# Patient Record
Sex: FEMALE | Race: WHITE | NOT HISPANIC OR LATINO | Employment: FULL TIME | URBAN - METROPOLITAN AREA
[De-identification: names, ages, dates, MRNs, and addresses within clinical notes are randomized per-mention and may not be internally consistent; named-entity substitution may affect disease eponyms.]

---

## 2024-09-27 ENCOUNTER — TELEPHONE (OUTPATIENT)
Dept: OTHER | Facility: OTHER | Age: 64
End: 2024-09-27

## 2024-09-27 ENCOUNTER — OFFICE VISIT (OUTPATIENT)
Dept: URGENT CARE | Facility: MEDICAL CENTER | Age: 64
End: 2024-09-27
Payer: COMMERCIAL

## 2024-09-27 ENCOUNTER — APPOINTMENT (OUTPATIENT)
Dept: RADIOLOGY | Facility: MEDICAL CENTER | Age: 64
End: 2024-09-27
Payer: COMMERCIAL

## 2024-09-27 VITALS
HEIGHT: 64 IN | HEART RATE: 59 BPM | WEIGHT: 142 LBS | SYSTOLIC BLOOD PRESSURE: 110 MMHG | RESPIRATION RATE: 16 BRPM | OXYGEN SATURATION: 98 % | BODY MASS INDEX: 24.24 KG/M2 | DIASTOLIC BLOOD PRESSURE: 61 MMHG | TEMPERATURE: 97.5 F
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
## 2024-09-27 DIAGNOSIS — S69.92XA INJURY OF LEFT WRIST, INITIAL ENCOUNTER: ICD-10-CM

## 2024-09-27 DIAGNOSIS — S60.449A TIGHT RING ON FINGER: ICD-10-CM

## 2024-09-27 DIAGNOSIS — S62.102A CLOSED FRACTURE OF LEFT WRIST, INITIAL ENCOUNTER: Primary | ICD-10-CM

## 2024-09-27 PROCEDURE — S9083 URGENT CARE CENTER GLOBAL: HCPCS

## 2024-09-27 PROCEDURE — G0384 LEV 5 HOSP TYPE B ED VISIT: HCPCS

## 2024-09-27 PROCEDURE — 73110 X-RAY EXAM OF WRIST: CPT

## 2024-09-27 PROCEDURE — 29125 APPL SHORT ARM SPLINT STATIC: CPT

## 2024-09-27 NOTE — PATIENT INSTRUCTIONS
"Your finalized x-ray results will be available on [facility] when read by the radiologist.  Recommend rest, ice, elevation.   Over the counter pain medication as directed on packaging as needed for pain (ex: Tylenol, ibuprofen - may alternate these two medications every 3 hours).  Placed in splint, keep in place and do not get wet until orthopedics follow up.  Utilize sling provided.    Follow up with PCP in 3-5 days.  Proceed to  ER if symptoms worsen.    If tests are performed, our office will contact you with results only if changes need to made to the care plan discussed with you at the visit. You can review your full results on [facility].    Patient Education     Wrist fracture   The Basics   Written by the doctors and editors at [facility]   What is a wrist fracture? -- A \"fracture\" is another word for a broken bone. There are different types of fractures, depending on which bone breaks and how it breaks. When a bone breaks, it might crack, break all of the way through, or shatter.  There are different types of wrist fractures. Some involve a break in 1 of the small bones in the wrist. Others involve a break in 1 or both of the forearm bones near the wrist (figure 1). A common type of wrist fracture involves the end of 1 of the forearm bones. It can happen when a person falls onto their outstretched hand.  What are the symptoms of a wrist fracture? -- Symptoms depend on which bone breaks and the type of break it is. Common symptoms can include:   Pain, swelling, or bruising over the area   The area looking abnormal, bent, or not the usual shape   Not being able to move the wrist and hand, or stiffness   Numbness in the area of the broken bone or part of the hand  Is there a test for a wrist fracture? -- Yes. The doctor or nurse will ask about your symptoms, do an exam, and take an X-ray. They will also ask about how the injury happened.  They might also do other imaging tests, such as a CT or ultrasound. " "Imaging tests create pictures of the inside of the body.  How are wrist fractures treated? -- Treatment depends, in part, on the type of fracture and how serious it is. The goal is to have the ends of the broken bone line up with each other so the bone can heal.  If the ends of the broken bone are already in line with each other, the doctor will put a cast, splint, or brace on the wrist. This will keep the bone in the correct position so it can heal. Children with a very minor fracture called a \"buckle fracture\" might only need a gauze or elastic bandage.  If the ends of the broken bone are not in line with each other, the doctor will need to line them up:   Sometimes, they can move the bone to the correct position without doing surgery, and then put a cast, splint, or brace on. This is called \"closed fracture reduction.\" Most wrist fractures in children only require casting.   For more serious fractures and for some wrist fractures in adults, they might need to do surgery to put the bone back in the correct position. During surgery, they can use screws, pins, rods, or plates to fix the bone inside the body. This is called \"open fracture reduction.\" In some cases, the hardware is removed after the fracture is healed.  How long do wrist fractures take to heal? -- Most fractures take weeks to months to heal. The doctor or nurse will talk to you about when to return to things like work, sports, or other activities.  Healing time also depends on the person. Healthy children usually heal much more quickly than older adults or adults with other medical problems.  How do I care for myself at home? -- To care for yourself or your child at home:   Follow the doctor's instructions for wearing the splint, brace, or cast. This supports and protects the bone as it heals. Some fractures are placed in a cast right away. Other fractures are not put in a cast until after the swelling goes down. You might also get a sling to support " your arm and wrist.   Do not get the cast wet unless the doctor says that it is waterproof.   Follow instructions for limiting activity and movement until the bone is healed. The doctor or nurse will tell you what activities are safe to do.   Prop the injured arm on pillows, keeping it above the level of the heart. This might help lessen pain and swelling for the first several days after the injury.   The doctor might recommend an over-the-counter pain medicine. These include acetaminophen (sample brand name: Tylenol), ibuprofen (sample brand names: Advil, Motrin), and naproxen (sample brand name: Aleve).   Some people get a prescription for stronger pain medicines to take for a short time. Follow the instructions for taking these medicines.   Ice can help with pain and swelling:   Put a cold gel pack, bag of ice, or bag of frozen vegetables on the injured area every 1 to 2 hours, for 15 minutes each time. Put a thin towel between the ice (or other cold object) and the skin.   Use the ice (or other cold object) for at least 6 hours after the injury. Some people find it helpful to ice longer, even up to 2 days after their injury.   Eat a healthy diet that includes plenty of calcium, vitamin D, and protein (figure 2).   If you smoke, try to stop. Broken bones take longer to heal if you smoke.   Some people need to work with a physical therapist (exercise expert) after their fracture heals. The physical therapist will suggest exercises and stretches to strengthen the wrist and hand muscles and keep them from getting stiff.  When should I call the doctor? -- Call for advice if:   There is less feeling or movement in your fingers.   Your hand or wrist becomes swollen or starts to hurt more.   Your skin becomes red or irritated around the cast, or the redness starts to spread up your arm.   The splint or cast feels too tight and uncomfortable.   There is a bad smell or drainage coming from the splint or cast.   The cast  "feels too loose, you notice a crack in the cast, or the cast becomes soft.   The cast gets wet, and it is not supposed to get wet.  All topics are updated as new evidence becomes available and our peer review process is complete.  This topic retrieved from [employer] on: Mar 21, 2024.  Topic 371044 Version 1.0  Release: 32.2.4 - C32.79  © 2024 UpToDate, Inc. and/or its affiliates. All rights reserved.  figure 1: Hand and wrist bones     This drawing shows the bones of the hand and wrist.  Graphic 81468 Version 2.0  figure 2: Foods and drinks with calcium and vitamin D     Foods rich in calcium include ice cream, soy milk, breads, kale, broccoli, milk, cheese, cottage cheese, almonds, yogurt, ready-to-eat cereals, beans, and tofu. Foods rich in vitamin D include milk, fortified plant-based \"milks\" (soy, almond), canned tuna fish, cod liver oil, yogurt, ready-to-eat-cereals, cooked salmon, canned sardines, mackerel, and eggs. Some of these foods are rich in both.  Graphic 98318 Version 4.0  Consumer Information Use and Disclaimer   Disclaimer: This generalized information is a limited summary of diagnosis, treatment, and/or medication information. It is not meant to be comprehensive and should be used as a tool to help the user understand and/or assess potential diagnostic and treatment options. It does NOT include all information about conditions, treatments, medications, side effects, or risks that may apply to a specific patient. It is not intended to be medical advice or a substitute for the medical advice, diagnosis, or treatment of a health care provider based on the health care provider's examination and assessment of a patient's specific and unique circumstances. Patients must speak with a health care provider for complete information about their health, medical questions, and treatment options, including any risks or benefits regarding use of medications. This information does not endorse any treatments or " medications as safe, effective, or approved for treating a specific patient. UpToDate, Inc. and its affiliates disclaim any warranty or liability relating to this information or the use thereof.The use of this information is governed by the Terms of Use, available at https://www.[employer].com/en/know/clinical-effectiveness-terms. 2024© UpToDate, Inc. and its affiliates and/or licensors. All rights reserved.  Copyright   © 2024 UpToDate, Inc. and/or its affiliates. All rights reserved.

## 2024-09-27 NOTE — PROGRESS NOTES
[facility]        NAME: Nivia Del Valle is a 63 y.o. female  : 1960    MRN: [id number]  DATE: 2024  TIME: 8:28 PM    Assessment and Plan   Closed fracture of left wrist, initial encounter [S62.102A]  1. Closed fracture of left wrist, initial encounter  Ambulatory Referral to Orthopedic Surgery    CANCELED: Splint application      2. Tight ring on finger  Foreign body removal      3. Injury of left wrist, initial encounter  XR wrist 3+ vw left        Left wrist x-ray: Fracture to radius and ulna per preliminary read. Radiology to determine final read.    Ring removed from injured extremity by assisting provider Michael Acosta with ring cutter. Splint placed, sling provided, advised care. Offered prescription pain medication, patient declined. Advised symptomatic treatment. Referral to orthopedics placed for follow up.    Patient Instructions     Your finalized x-ray results will be available on [facility] when read by the radiologist.  Recommend rest, ice, elevation.   Over the counter pain medication as directed on packaging as needed for pain (ex: Tylenol, ibuprofen - may alternate these two medications every 3 hours).  Placed in splint, keep in place and do not get wet until orthopedics follow up.  Utilize sling provided.    Follow up with PCP in 3-5 days.  Proceed to  ER if symptoms worsen.    If tests are performed, our office will contact you with results only if changes need to made to the care plan discussed with you at the visit. You can [facility] full [facility].    Chief Complaint     Chief Complaint   Patient presents with    Wrist Injury     Pt slipped on water and fell injuring left wrist         History of Present Illness       Patient slipped on water on her kitchen floor and fell backwards. She put her left wrist out to support her when falling, leading to pain, swelling, bruising, and decreased range of motion of her left wrist. This occurred around 1-2  "hours prior to visit. She has been icing the injury since it happened, notes that has helped significantly with the pain. She has not taken any pain medication.    Wrist Pain   The pain is present in the left wrist. This is a new problem. The current episode started today. There has been a history of trauma (FOOSH). The problem occurs constantly. The problem has been unchanged. The pain is at a severity of 10/10. Associated symptoms include joint swelling, a limited range of motion and stiffness. Pertinent negatives include no numbness or tingling. She has tried cold for the symptoms. The treatment provided mild relief.       Review of Systems   Review of Systems   Musculoskeletal:  Positive for arthralgias, joint swelling and stiffness.   Skin:  Positive for color change (bruising).   Neurological:  Negative for tingling and numbness.         Current Medications     No current outpatient medications on file.    Current Allergies     Allergies as of 09/27/2024    (No Known Allergies)            The following portions of the patient's history were reviewed and updated as appropriate: allergies, current medications, past family history, past medical history, past social history, past surgical history and problem list.     History reviewed. No pertinent past medical history.    History reviewed. No pertinent surgical history.    History reviewed. No pertinent family history.      Medications have been verified.        Objective   /61   Pulse 59   Temp 97.5 °F (36.4 °C) (Tympanic)   Resp 16   Ht 5' 4\" (1.626 m)   Wt 64.4 kg (142 lb)   SpO2 98%   BMI 24.37 kg/m²        Physical Exam     Physical Exam  Vitals and nursing note reviewed.   Constitutional:       General: She is not in acute distress.     Appearance: Normal appearance. She is not ill-appearing.   Cardiovascular:      Rate and Rhythm: Normal rate and regular rhythm.      Pulses: Normal pulses.      Heart sounds: Normal heart sounds.   Pulmonary: " "     Effort: Pulmonary effort is normal.      Breath sounds: Normal breath sounds.   Musculoskeletal:      Left wrist: Swelling, deformity, tenderness and bony tenderness (radius and ulna) present. Decreased range of motion. Normal pulse.   Neurological:      Mental Status: She is alert.           Universal Protocol:  Procedure performed by: (Michael Acosta)  Consent: Verbal consent obtained.  Risks and benefits: risks, benefits and alternatives were discussed  Consent given by: patient  Time out: Immediately prior to procedure a \"time out\" was called to verify the correct patient, procedure, equipment, support staff and site/side marked as required.  Patient understanding: patient states understanding of the procedure being performed  Patient identity confirmed: verbally with patient  Foreign body removal    Date/Time: 9/27/2024 6:30 PM    Performed by: Leeanna Ryder PA-C  Authorized by: Leeanna Ryder PA-C  Intake: finger.    Sedation:  Patient sedated: no  Patient restrained: no  Patient cooperative: yes  Complexity: simple  1 objects recovered.  Objects recovered: ring  Post-procedure assessment: foreign body removed  Patient tolerance: patient tolerated the procedure well with no immediate complications  Comments: Ring stuck on left ring finger. Attempted to remove with wound packing strip wrapped around finger, unsuccessful. Utilized ring cutters to remove.    Splint application    Date/Time: 9/27/2024 6:30 PM    Performed by: Leeanna Ryder PA-C  Authorized by: Leeanna Ryder PA-C  Universal Protocol:  Procedure performed by: (Chantale Terrazas)  Consent: Verbal consent obtained.  Risks and benefits: risks, benefits and alternatives were discussed  Consent given by: patient  Time out: Immediately prior to procedure a \"time out\" was called to verify the correct patient, procedure, equipment, support staff and site/side marked as required.  Patient understanding: patient states " understanding of the procedure being performed  Patient identity confirmed: verbally with patient    Pre-procedure details:     Sensation:  Normal  Procedure details:     Laterality:  Left    Location:  Wrist    Wrist:  L wrist    Strapping: no  Cast type:  Short arm        Splint type:  Sugar tong    Supplies:  Cotton padding, elastic bandage, Ortho-Glass, 2 layer wrap and sling  Post-procedure details:     Pain:  Unchanged    Sensation:  Normal    Patient tolerance of procedure:  Tolerated well, no immediate complications

## 2024-09-28 NOTE — TELEPHONE ENCOUNTER
Pt needing to schedule f/u with ortho- new left wrist fx. Would like to schedule at [facility].

## 2024-10-01 ENCOUNTER — OFFICE VISIT (OUTPATIENT)
Dept: OBGYN CLINIC | Facility: MEDICAL CENTER | Age: 64
End: 2024-10-01
Payer: COMMERCIAL

## 2024-10-01 VITALS
DIASTOLIC BLOOD PRESSURE: 78 MMHG | WEIGHT: 142 LBS | HEIGHT: 64 IN | BODY MASS INDEX: 24.24 KG/M2 | HEART RATE: 71 BPM | SYSTOLIC BLOOD PRESSURE: 161 MMHG

## 2024-10-01 DIAGNOSIS — S62.102A CLOSED FRACTURE OF LEFT WRIST, INITIAL ENCOUNTER: Primary | ICD-10-CM

## 2024-10-01 PROCEDURE — 99204 OFFICE O/P NEW MOD 45 MIN: CPT | Performed by: ORTHOPAEDIC SURGERY

## 2024-10-01 RX ORDER — ACETAMINOPHEN 325 MG/1
325 TABLET ORAL EVERY 6 HOURS PRN
COMMUNITY

## 2024-10-01 RX ORDER — IBUPROFEN 200 MG
200 TABLET ORAL EVERY 6 HOURS PRN
COMMUNITY

## 2024-10-01 NOTE — PROGRESS NOTES
The HAND & UPPER EXTREMITY OFFICE VISIT   Referred By:  [place] [address] [address] [address]      Chief Complaint:     Left wrist pain  DOI: 9/27/24    History of Present Illness:   63 y.o., Right hand dominant female presents with left wrist pain following a slip and fall on 9/27. She reports walking into her dog's water bowl which splashed water onto the floor, and then caused her to slip and fall, landing onto the left wrist. Previously seen and evaluated at [facility] following the injury. Note and imaging reviewed in detail today. She was placed in a sugar tong splint during that visit.    Today she denies any numbness, tingling, or significant pain in the splint. She has been taking tylenol and motrin regularly for pain control.       ADLs: Community ambulator  Smoke: denies ETOH: denies   Drugs:  denies Job: employed, stock room       Past Medical History:  History reviewed. No pertinent past medical history.  History reviewed. No pertinent surgical history.  History reviewed. No pertinent family history.  Social History     Socioeconomic History    Marital status: /Civil Union     Spouse name: Not on file    Number of children: Not on file    Years of education: Not on file    Highest education level: Not on file   Occupational History    Not on file   Tobacco Use    Smoking status: Not on file    Smokeless tobacco: Not on file   Substance and Sexual Activity    Alcohol use: Not on file    Drug use: Not on file    Sexual activity: Not on file   Other Topics Concern    Not on file   Social History Narrative    Not on file     Social Determinants of Health     Financial Resource Strain: Not on file   Food Insecurity: Not on file   Transportation Needs: Not on file   Physical Activity: Not on file   Stress: Not on file   Social Connections: Not on file   Intimate Partner Violence: Not on file   Housing Stability: Not on file     Scheduled Meds:  Continuous Infusions:No  "current facility-administered medications for this visit.    PRN Meds:.  No Known Allergies        Physical Examination:    /78   Pulse 71   Ht 5' 4\" (1.626 m)   Wt 64.4 kg (142 lb)   BMI 24.37 kg/m²     Gen: A&Ox3, NAD  Cardiac: regular rate  Chest: non labored breathing  Abdomen: Non-distended      Right Upper Extremity:  Skin CDI  No obvious deformity of the shoulder, arm, elbow, forearm, wrist, hand  Sensation intact to light touch in the axillary median, ulnar, and radial nerve distributions  Motor function grossly intact  2+RP      Left Upper Extremity:  Sugar tong splint clean and intact  Visible skin CDI  Mild swelling of the fingers  Sensation intact to light touch in the axillary median, ulnar, and radial nerve distributions  Finger ROM intact within confines of the splint  Warm, well-perfused digits      Studies:  Radiographs: I personally reviewed and independently interpreted the available radiographs.  9/27/24: Radiographs of the left wrist, multiple views, demonstrate acute fracture of the distal radius with intra-articular extension. Approximately 47 degrees dorsal tilt and 7 mm ulnar positive variance. There is also a displaced fracture of the ulnar styloid.       Assessment and Plan:  1. Closed fracture of left wrist, initial encounter  Ambulatory Referral to Orthopedic Surgery          63 y.o. female presents with signs and symptoms consistent with the above diagnosis.  We discussed the natural history of this condition and its pathogenesis.  We discussed operative and nonoperative treatment options. XR were reviewed and were discussed with the patient. We discussed that due to the dorsal tilt and shortening of her distal radius fracture, surgical intervention is indicated. We reviewed the risks and benefits of both operative and non-operative management. We discussed that without surgery, risks include chronic pain and loss of wrist ROM due to malunion and ulnar prominence. We reviewed " the risks of surgery which include, but are not limited to, infection, bleeding, damage to nearby structures including nerves/arteries, stiffness, hardware failure, malunion, nonunion, need for further surgery, anesthesia risks, CRPS and continued pain.    After a long discussion and shared decision making she made the informed decision to proceed with non-operative intervention. We will plan to keep her splint in place for 1 more week then transition to a short arm cast once swelling has decreased. It is recommended she return to the office in 1 week, or sooner should symptoms worsen.    she expressed understanding of the plan and agreed. We encouraged them to contact our office with any questions or concerns.         Armando Riley MD  Hand and Upper Extremity Surgery        *This note was dictated using Dragon voice recognition software. Please excuse any word substitutions or errors.*      Scribe Attestation      I,:  Jessica Shi PA-C am acting as a scribe while in the presence of the attending physician.:       I,:  Armando Riley MD personally performed the services described in this documentation    as scribed in my presence.:

## 2024-10-09 ENCOUNTER — OFFICE VISIT (OUTPATIENT)
Dept: OBGYN CLINIC | Facility: MEDICAL CENTER | Age: 64
End: 2024-10-09
Payer: COMMERCIAL

## 2024-10-09 ENCOUNTER — APPOINTMENT (OUTPATIENT)
Dept: RADIOLOGY | Facility: MEDICAL CENTER | Age: 64
End: 2024-10-09
Payer: COMMERCIAL

## 2024-10-09 VITALS
HEART RATE: 73 BPM | SYSTOLIC BLOOD PRESSURE: 149 MMHG | BODY MASS INDEX: 24.92 KG/M2 | WEIGHT: 146 LBS | HEIGHT: 64 IN | DIASTOLIC BLOOD PRESSURE: 80 MMHG

## 2024-10-09 DIAGNOSIS — S62.102A CLOSED FRACTURE OF LEFT WRIST, INITIAL ENCOUNTER: Primary | ICD-10-CM

## 2024-10-09 DIAGNOSIS — S62.102A CLOSED FRACTURE OF LEFT WRIST, INITIAL ENCOUNTER: ICD-10-CM

## 2024-10-09 PROCEDURE — 29075 APPL CST ELBW FNGR SHORT ARM: CPT | Performed by: ORTHOPAEDIC SURGERY

## 2024-10-09 PROCEDURE — 73110 X-RAY EXAM OF WRIST: CPT

## 2024-10-09 PROCEDURE — 99213 OFFICE O/P EST LOW 20 MIN: CPT | Performed by: ORTHOPAEDIC SURGERY

## 2024-10-10 NOTE — PROGRESS NOTES
The HAND & UPPER EXTREMITY OFFICE VISIT   Referred By:  No referring provider defined for this encounter.      Chief Complaint:     Left wrist pain  DOI: 9/27/24    History of Present Illness:   63 y.o., Right hand dominant female presents with left wrist pain following a slip and fall on 9/27. She reports walking into her dog's water bowl which splashed water onto the floor, and then caused her to slip and fall, landing onto the left wrist. Previously seen and evaluated at [facility] following the injury. Note and imaging reviewed in detail today. She was placed in a sugar tong splint during that visit.    Today she denies any numbness, tingling, or significant pain in the splint. She has been taking tylenol and motrin regularly for pain control.       ADLs: Community ambulator  Smoke: denies ETOH: denies   Drugs:  denies Job: employed, stock room       Interval history:  States that her pain remains well-controlled.  Takes an occasional Tylenol but otherwise no discomfort.  Splint comfortable for the most part but at the top near her elbow she feels is little irritated and itching.  Denies numbness or tingling.  States that she continues to desire nonoperative management.      Past Medical History:  No past medical history on file.  No past surgical history on file.  No family history on file.  Social History     Socioeconomic History    Marital status: /Civil Union     Spouse name: Not on file    Number of children: Not on file    Years of education: Not on file    Highest education level: Not on file   Occupational History    Not on file   Tobacco Use    Smoking status: Not on file    Smokeless tobacco: Not on file   Substance and Sexual Activity    Alcohol use: Not on file    Drug use: Not on file    Sexual activity: Not on file   Other Topics Concern    Not on file   Social History Narrative    Not on file     Social Determinants of Health     Financial Resource Strain: Not on file   Food Insecurity: Not  "on file   Transportation Needs: Not on file   Physical Activity: Not on file   Stress: Not on file   Social Connections: Not on file   Intimate Partner Violence: Not on file   Housing Stability: Not on file     Scheduled Meds:  Continuous Infusions:No current facility-administered medications for this visit.    PRN Meds:.  No Known Allergies        Physical Examination:    /80   Pulse 73   Ht 5' 4\" (1.626 m)   Wt 66.2 kg (146 lb)   BMI 25.06 kg/m²     Gen: A&Ox3, NAD        Left Upper Extremity:  Sugar tong splint clean and intact (remove)  Underlying skin CDI  Slight radial deviation extension deformity of the wrist with swelling around the distal radius  Mild swelling of the fingers  Able to make a composite fist  Sensation intact to light touch in the axillary median, ulnar, and radial nerve distributions  Warm, well-perfused digits      Studies:  Radiographs: I personally reviewed and independently interpreted the available radiographs.  10/9/24: Radiographs of the left wrist, multiple views, demonstrate stable alignment of the distal radius with intra-articular extension. Approximately 47 degrees dorsal tilt and 7 mm ulnar positive variance. There is also a displaced fracture of the ulnar styloid.       Assessment and Plan:  1. Closed fracture of left wrist, initial encounter  XR wrist 3+ vw left          63 y.o. female is now approximately 2 weeks out from injury.  We reviewed her radiographs and continued displacement.  We discussed again the potential risks of nonoperative management and malunion.  She understands these risks and wished to proceed with continued nonoperative management.  Her swelling is improved and we transitioned her to a cast today.  This is well-tolerated.  Please see procedure note for details.      I will see her back in 2 weeks for repeat radiographs in the cast.  Anticipate an additional 4 to 8 weeks of casting depending on radiographic healing.    she expressed " understanding of the plan and agreed. We encouraged them to contact our office with any questions or concerns.         Armando Riley MD  Hand and Upper Extremity Surgery        *This note was dictated using Dragon voice recognition software. Please excuse any word substitutions or errors.*      Scribe Attestation      I,:   am acting as a scribe while in the presence of the attending physician.:       I,:   personally performed the services described in this documentation    as scribed in my presence.:

## 2024-10-23 ENCOUNTER — OFFICE VISIT (OUTPATIENT)
Dept: OBGYN CLINIC | Facility: MEDICAL CENTER | Age: 64
End: 2024-10-23
Payer: COMMERCIAL

## 2024-10-23 ENCOUNTER — APPOINTMENT (OUTPATIENT)
Dept: RADIOLOGY | Facility: MEDICAL CENTER | Age: 64
End: 2024-10-23
Payer: COMMERCIAL

## 2024-10-23 VITALS
WEIGHT: 146 LBS | HEART RATE: 69 BPM | BODY MASS INDEX: 24.92 KG/M2 | SYSTOLIC BLOOD PRESSURE: 131 MMHG | DIASTOLIC BLOOD PRESSURE: 75 MMHG | HEIGHT: 64 IN

## 2024-10-23 DIAGNOSIS — S62.102A CLOSED FRACTURE OF LEFT WRIST, INITIAL ENCOUNTER: ICD-10-CM

## 2024-10-23 DIAGNOSIS — S62.102D CLOSED FRACTURE OF LEFT WRIST WITH ROUTINE HEALING, SUBSEQUENT ENCOUNTER: Primary | ICD-10-CM

## 2024-10-23 PROCEDURE — 73110 X-RAY EXAM OF WRIST: CPT

## 2024-10-23 PROCEDURE — 99213 OFFICE O/P EST LOW 20 MIN: CPT | Performed by: ORTHOPAEDIC SURGERY

## 2024-10-23 NOTE — PROGRESS NOTES
"The HAND & UPPER EXTREMITY OFFICE VISIT   Referred By:  No referring provider defined for this encounter.      Chief Complaint:     Left wrist pain  DOI: 9/27/24      History of Present Illness:   Left distal radius fracture sustained 9/27/2024, treated non-operatively per patient's wishes with closed immobilization in a short arm cast that was placed at her last visit.       Interval History 10/23/2024:   Today she denies any numbness, tingling, or significant pain in the cast. She has not needed pain medication. She has been elevating the extremity. She has kept the cast clean and dry.       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  No past surgical history on file.  No family history on file.  Social History     Socioeconomic History    Marital status: /Civil Union     Spouse name: Not on file    Number of children: Not on file    Years of education: Not on file    Highest education level: Not on file   Occupational History    Not on file   Tobacco Use    Smoking status: Not on file    Smokeless tobacco: Not on file   Substance and Sexual Activity    Alcohol use: Not on file    Drug use: Not on file    Sexual activity: Not on file   Other Topics Concern    Not on file   Social History Narrative    Not on file     Social Determinants of Health     Financial Resource Strain: Not on file   Food Insecurity: Not on file   Transportation Needs: Not on file   Physical Activity: Not on file   Stress: Not on file   Social Connections: Not on file   Intimate Partner Violence: Not on file   Housing Stability: Not on file     Scheduled Meds:  Continuous Infusions:No current facility-administered medications for this visit.    PRN Meds:.  No Known Allergies        Physical Examination:    /75   Pulse 69   Ht 5' 4\" (1.626 m)   Wt 66.2 kg (146 lb)   BMI 25.06 kg/m²     Gen: A&Ox3, " NAD        Left Upper Extremity:  Short arm cast intact, clean, dry  Mild swelling of the fingers  Unable to make a composite fist, limited by cast  Unable to make claw fist due to some PIP stiffness  Sensation intact to light touch in the axillary median, ulnar, and radial nerve distributions  Warm, well-perfused digits      Studies:  Radiographs: I personally reviewed and independently interpreted the available radiographs.  10/23/24: Radiographs of the left wrist, multiple views, demonstrate stable alignment of the distal radius with intra-articular extension.     10/9/24: Radiographs of the left wrist, multiple views, demonstrate stable alignment of the distal radius with intra-articular extension. Approximately 47 degrees dorsal tilt and 7 mm ulnar positive variance. There is also a displaced fracture of the ulnar styloid.       Assessment and Plan:  1. Closed fracture of left wrist with routine healing, subsequent encounter  XR wrist 3+ vw left          63 y.o. female is now approximately 3.5 weeks out from injury.  We reviewed her radiographs and continued displacement.  We discussed again the potential risks of nonoperative management and malunion.  Discussed elevating the extremity and emphasized the importance of performing range of motion exercises of the fingers and elbow to prevent stiffness.     I will see her back in 3 weeks for repeat radiographs in the cast.  Anticipate an additional 3 to 6 weeks of casting depending on radiographic healing.    she expressed understanding of the plan and agreed. We encouraged them to contact our office with any questions or concerns.         Armando Riley MD  Hand and Upper Extremity Surgery        *This note was dictated using Dragon voice recognition software. Please excuse any word substitutions or errors.*      Scribe Attestation      I,:  Brenda Mixon am acting as a scribe while in the presence of the attending physician.:       I,:  Armando Grier  MD Rosemary personally performed the services described in this documentation    as scribed in my presence.:

## 2024-11-20 ENCOUNTER — APPOINTMENT (OUTPATIENT)
Dept: RADIOLOGY | Facility: MEDICAL CENTER | Age: 64
End: 2024-11-20
Payer: COMMERCIAL

## 2024-11-20 ENCOUNTER — OFFICE VISIT (OUTPATIENT)
Dept: OBGYN CLINIC | Facility: MEDICAL CENTER | Age: 64
End: 2024-11-20
Payer: COMMERCIAL

## 2024-11-20 VITALS
HEIGHT: 64 IN | DIASTOLIC BLOOD PRESSURE: 80 MMHG | BODY MASS INDEX: 25.95 KG/M2 | HEART RATE: 74 BPM | SYSTOLIC BLOOD PRESSURE: 147 MMHG | WEIGHT: 152 LBS

## 2024-11-20 DIAGNOSIS — S62.102D CLOSED FRACTURE OF LEFT WRIST WITH ROUTINE HEALING, SUBSEQUENT ENCOUNTER: ICD-10-CM

## 2024-11-20 DIAGNOSIS — S62.102D CLOSED FRACTURE OF LEFT WRIST WITH ROUTINE HEALING, SUBSEQUENT ENCOUNTER: Primary | ICD-10-CM

## 2024-11-20 PROCEDURE — 99213 OFFICE O/P EST LOW 20 MIN: CPT | Performed by: ORTHOPAEDIC SURGERY

## 2024-11-20 PROCEDURE — 73110 X-RAY EXAM OF WRIST: CPT

## 2024-11-20 NOTE — LETTER
November 20, 2024     Patient: Nivia Del Valle  YOB: 1960  Date of Visit: 11/20/2024      To Whom it May Concern:    Nivia Del Valle is under my professional care. Nivia was seen in my office on 11/20/2024. Nivia may return to work with limitations on 12/1/24. No lifting/pushing/pulling >2 lbs with the left wrist, and must be allowed to wear brace .    If you have any questions or concerns, please don't hesitate to call.         Sincerely,          Armando Riley MD        CC: No Recipients

## 2024-11-20 NOTE — PROGRESS NOTES
"The HAND & UPPER EXTREMITY OFFICE VISIT   Referred By:  No referring provider defined for this encounter.      Chief Complaint:     Left wrist pain  DOI: 9/27/24      History of Present Illness:   Left distal radius fracture sustained 9/27/2024, treated non-operatively per patient's wishes with closed immobilization in a short arm cast that was placed at her last visit.       Interval History 10/23/2024:   Today she denies any numbness, tingling, or significant pain in the cast. She has not needed pain medication. She has been elevating the extremity. She has kept the cast clean and dry.       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 No pertinent past medical history.  History reviewed. No pertinent surgical history.  History reviewed. No pertinent family history.  Social History     Socioeconomic History    Marital status: /Civil Union     Spouse name: Not on file    Number of children: Not on file    Years of education: Not on file    Highest education level: Not on file   Occupational History    Not on file   Tobacco Use    Smoking status: Not on file    Smokeless tobacco: Not on file   Substance and Sexual Activity    Alcohol use: Never    Drug use: Never    Sexual activity: Not on file   Other Topics Concern    Not on file   Social History Narrative    Not on file     Social Drivers of Health     Financial Resource Strain: Not on file   Food Insecurity: Not on file   Transportation Needs: Not on file   Physical Activity: Not on file   Stress: Not on file   Social Connections: Not on file   Intimate Partner Violence: Not on file   Housing Stability: Not on file     Scheduled Meds:  Continuous Infusions:No current facility-administered medications for this visit.    PRN Meds:.  No Known Allergies        Physical Examination:    /80   Pulse 74   Ht 5' 4\" (1.626 m)   Wt 68.9 kg (152 lb)   BMI " 26.09 kg/m²     Gen: A&Ox3, NAD        Left Upper Extremity:  Short arm cast intact, clean, dry  Mild swelling of the fingers  Unable to make a composite fist, limited by cast  Unable to make claw fist due to some PIP stiffness  Sensation intact to light touch in the axillary median, ulnar, and radial nerve distributions  Warm, well-perfused digits      Studies:  Radiographs: I personally reviewed and independently interpreted the available radiographs.  11/20/24: Radiographs of the left wrist, multiple views, demonstrate stable alignment of the distal radius with intra-articular extension.  There is interval callus formation, particular dorsally which is bridging.  There is significant shortening and ulnar positive variance.    10/9/24: Radiographs of the left wrist, multiple views, demonstrate stable alignment of the distal radius with intra-articular extension. Approximately 47 degrees dorsal tilt and 7 mm ulnar positive variance. There is also a displaced fracture of the ulnar styloid.       Assessment and Plan:  1. Closed fracture of left wrist with routine healing, subsequent encounter  XR wrist 3+ vw left    Cock Up Wrist Splint          64 y.o. female is now approximately 7 weeks out from injury.  We reviewed her radiographs and continued malunion.  She does seem to have interval callus formation particular bridging callus dorsally.  I think it is stable for her to transition removable brace at this time.  She can start to work on active wrist range of motion but avoid heavy lifting.  Wear the brace continuously except for range of motion hygiene for at least 2 more weeks.  She can then return light duty to work with less than 2 pounds lifting while wearing the brace.      I will see her back in 4 weeks for repeat radiographs and a range of motion check    she expressed understanding of the plan and agreed. We encouraged them to contact our office with any questions or concerns.         Armando Riley,  MD  Hand and Upper Extremity Surgery        *This note was dictated using Dragon voice recognition software. Please excuse any word substitutions or errors.*

## 2024-11-21 ENCOUNTER — TELEPHONE (OUTPATIENT)
Dept: OBGYN CLINIC | Facility: HOSPITAL | Age: 64
End: 2024-11-21

## 2024-11-21 NOTE — TELEPHONE ENCOUNTER
Caller: Pt    Doctor: Rosemary     Reason for call: requesting a new note stating to stay out of work. She doesn't feel comfortable returning on Dec 1st. States that she is retiring end of December. She would like to extend short term disability to 12/11.      Call back#: [phone]

## 2024-11-25 ENCOUNTER — TELEPHONE (OUTPATIENT)
Age: 64
End: 2024-11-25

## 2024-12-09 ENCOUNTER — TELEPHONE (OUTPATIENT)
Age: 64
End: 2024-12-09

## 2024-12-09 NOTE — TELEPHONE ENCOUNTER
Confirmed no current STD/FMLA paperwork in media section of chart.  Patient going to bring paperwork to office as it needs to be filled out by 12/10/24.

## 2024-12-11 ENCOUNTER — TELEPHONE (OUTPATIENT)
Dept: OBGYN CLINIC | Facility: HOSPITAL | Age: 64
End: 2024-12-11

## 2024-12-11 NOTE — TELEPHONE ENCOUNTER
Caller: Patient    Doctor: Rosemary    Reason for call: Patient is calling about her disability paperwork. The paperwork that was faxed on 11/15/24 was faxed to an incorrect # It should go to [phone]. Can you please refax to that #. Also she wondered if the new paperwork she dropped off could be completed as well since they are going to stop disability benefits if not received. Thank you.    Call back#: [phone]

## 2024-12-11 NOTE — TELEPHONE ENCOUNTER
Patient is calling back asking if the documents were re faxed to the  faxes number: [phone]. Informed patient forms were faxed. No further questions.

## 2024-12-11 NOTE — TELEPHONE ENCOUNTER
Caller: Patient    Doctor: Rosemary    Reason for call: The patient called to advise the fax# in the previous message is incorrect. Please fax the forms to [phone]. Please contact the patient to confirm the forms were faxed    Call back#: [phone]

## 2024-12-12 NOTE — TELEPHONE ENCOUNTER
Caller: Cristhian from [facility]    Doctor/Office: Rosemary/Katie    CB#:  ext [id number]      What needs to be faxed: ROSEY from 11/20/24.    ATTN to: Cristhian    Fax#: [phone]      Documents were successfully e-faxed

## 2024-12-19 ENCOUNTER — APPOINTMENT (OUTPATIENT)
Dept: RADIOLOGY | Facility: MEDICAL CENTER | Age: 64
End: 2024-12-19
Payer: COMMERCIAL

## 2024-12-19 ENCOUNTER — OFFICE VISIT (OUTPATIENT)
Dept: OBGYN CLINIC | Facility: MEDICAL CENTER | Age: 64
End: 2024-12-19
Payer: COMMERCIAL

## 2024-12-19 VITALS — BODY MASS INDEX: 26.29 KG/M2 | HEIGHT: 64 IN | WEIGHT: 154 LBS

## 2024-12-19 DIAGNOSIS — S52.572P OTHER CLOSED INTRA-ARTICULAR FRACTURE OF DISTAL END OF LEFT RADIUS WITH MALUNION, SUBSEQUENT ENCOUNTER: Primary | ICD-10-CM

## 2024-12-19 DIAGNOSIS — M25.532 PAIN IN LEFT WRIST: ICD-10-CM

## 2024-12-19 PROCEDURE — 99213 OFFICE O/P EST LOW 20 MIN: CPT | Performed by: ORTHOPAEDIC SURGERY

## 2024-12-19 PROCEDURE — 73110 X-RAY EXAM OF WRIST: CPT

## 2024-12-19 NOTE — PROGRESS NOTES
The HAND & UPPER EXTREMITY OFFICE VISIT   Referred By:  Referral Self  No address on file      Chief Complaint:     Left wrist pain  DOI: 9/27/24      History of Present Illness:   Left distal radius fracture sustained 9/27/2024, treated non-operatively per patient's wishes with closed immobilization in a short arm cast that was placed at her last visit.       Interval History 12/19/2024:   She is now approximately 3 months status post injury.  Today she denies any numbness, tingling, or significant pain.  She is advancing her activities and she feels that she is getting better day by day.  She still wearing the brace to sleep and and for activities during the day.       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 No pertinent past medical history.  History reviewed. No pertinent surgical history.  History reviewed. No pertinent family history.  Social History     Socioeconomic History    Marital status: /Civil Union     Spouse name: Not on file    Number of children: Not on file    Years of education: Not on file    Highest education level: Not on file   Occupational History    Not on file   Tobacco Use    Smoking status: Never    Smokeless tobacco: Never   Vaping Use    Vaping status: Never Used   Substance and Sexual Activity    Alcohol use: Never    Drug use: Never    Sexual activity: Not on file   Other Topics Concern    Not on file   Social History Narrative    Not on file     Social Drivers of Health     Financial Resource Strain: Not on file   Food Insecurity: Not on file   Transportation Needs: Not on file   Physical Activity: Not on file   Stress: Not on file   Social Connections: Not on file   Intimate Partner Violence: Not on file   Housing Stability: Not on file     Scheduled Meds:  Continuous Infusions:No current facility-administered medications for this visit.    PRN Meds:.  No Known  "Allergies        Physical Examination:    Ht 5' 4\" (1.626 m)   Wt 69.9 kg (154 lb)   BMI 26.43 kg/m²     Gen: A&Ox3, NAD    Left Upper Extremity:  Skin intact  Prominence of the ulnar head noted with radial deviation of the wrist  Mild swelling of the fingers and wrist  Able to make 90% composite fist  Unable to make claw fist due to some PIP stiffness  Minimal wrist range of motion with approximately 10 degrees flexion extension  60 degrees pronation, 45 degrees supination  Nontender  Sensation intact to light touch in the axillary median, ulnar, and radial nerve distributions  Warm, well-perfused digits      Studies:  Radiographs: I personally reviewed and independently interpreted the available radiographs.  12/19/2024: Radiographs of the left wrist, multiple views demonstrate stable alignment with interval bridging callus formation.  Continued shortening and ulnar positive variance.    11/20/24: Radiographs of the left wrist, multiple views, demonstrate stable alignment of the distal radius with intra-articular extension.  There is interval callus formation, particular dorsally which is bridging.  There is significant shortening and ulnar positive variance.    10/9/24: Radiographs of the left wrist, multiple views, demonstrate stable alignment of the distal radius with intra-articular extension. Approximately 47 degrees dorsal tilt and 7 mm ulnar positive variance. There is also a displaced fracture of the ulnar styloid.       Assessment and Plan:  1. Other closed intra-articular fracture of distal end of left radius with malunion, subsequent encounter        2. Pain in left wrist  XR wrist 3+ vw left          64 y.o. female is now approximately 3 months out from injury.  We reviewed her radiographs and continued malunion.  Her fracture not easily seen fully healed.  She can start to wean out of the brace and advance range of motion and weightbearing as tolerated.  She is doing very well so far and is happy " with her progress.  She is not bothered by the ulnar prominence.  She is excited to start to advance some of her activities.  She would like to follow-up as needed for repeat evaluation if she has difficulty regaining motion or increased pain.    she expressed understanding of the plan and agreed. We encouraged them to contact our office with any questions or concerns.         Armando Riley MD  Hand and Upper Extremity Surgery        *This note was dictated using Dragon voice recognition software. Please excuse any word substitutions or errors.*